# Patient Record
Sex: FEMALE | Race: WHITE | NOT HISPANIC OR LATINO | Employment: UNEMPLOYED | URBAN - METROPOLITAN AREA
[De-identification: names, ages, dates, MRNs, and addresses within clinical notes are randomized per-mention and may not be internally consistent; named-entity substitution may affect disease eponyms.]

---
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
## 2024-02-02 ENCOUNTER — HOSPITAL ENCOUNTER (EMERGENCY)
Facility: HOSPITAL | Age: 24
Discharge: HOME | End: 2024-02-03
Attending: EMERGENCY MEDICINE | Admitting: EMERGENCY MEDICINE
Payer: COMMERCIAL

## 2024-02-02 ENCOUNTER — APPOINTMENT (OUTPATIENT)
Dept: ULTRASOUND IMAGING | Facility: HOSPITAL | Age: 24
End: 2024-02-02
Attending: EMERGENCY MEDICINE
Payer: COMMERCIAL

## 2024-02-02 DIAGNOSIS — R10.84 GENERALIZED ABDOMINAL PAIN: ICD-10-CM

## 2024-02-02 LAB
ABO/RH(D): NORMAL
ALBUMIN UR-MCNC: NEGATIVE MG/DL
AMORPH CRY #/AREA URNS HPF: ABNORMAL /HPF
ANION GAP SERPL CALCULATED.3IONS-SCNC: 7 MMOL/L (ref 7–15)
ANTIBODY SCREEN: NEGATIVE
APPEARANCE UR: ABNORMAL
BASOPHILS # BLD AUTO: 0 10E3/UL (ref 0–0.2)
BASOPHILS NFR BLD AUTO: 0 %
BILIRUB UR QL STRIP: NEGATIVE
BUN SERPL-MCNC: 13.3 MG/DL (ref 6–20)
CALCIUM SERPL-MCNC: 9.5 MG/DL (ref 8.6–10)
CHLORIDE SERPL-SCNC: 106 MMOL/L (ref 98–107)
COLOR UR AUTO: ABNORMAL
CREAT SERPL-MCNC: 0.8 MG/DL (ref 0.51–0.95)
DEPRECATED HCO3 PLAS-SCNC: 28 MMOL/L (ref 22–29)
EGFRCR SERPLBLD CKD-EPI 2021: >90 ML/MIN/1.73M2
EOSINOPHIL # BLD AUTO: 0.1 10E3/UL (ref 0–0.7)
EOSINOPHIL NFR BLD AUTO: 1 %
ERYTHROCYTE [DISTWIDTH] IN BLOOD BY AUTOMATED COUNT: 12.6 % (ref 10–15)
GLUCOSE SERPL-MCNC: 102 MG/DL (ref 70–99)
GLUCOSE UR STRIP-MCNC: NEGATIVE MG/DL
HCG INTACT+B SERPL-ACNC: <1 MIU/ML
HCT VFR BLD AUTO: 42.9 % (ref 35–47)
HGB BLD-MCNC: 14.6 G/DL (ref 11.7–15.7)
HGB UR QL STRIP: NEGATIVE
IMM GRANULOCYTES # BLD: 0 10E3/UL
IMM GRANULOCYTES NFR BLD: 0 %
KETONES UR STRIP-MCNC: NEGATIVE MG/DL
LEUKOCYTE ESTERASE UR QL STRIP: ABNORMAL
LYMPHOCYTES # BLD AUTO: 2.5 10E3/UL (ref 0.8–5.3)
LYMPHOCYTES NFR BLD AUTO: 32 %
MCH RBC QN AUTO: 29.2 PG (ref 26.5–33)
MCHC RBC AUTO-ENTMCNC: 34 G/DL (ref 31.5–36.5)
MCV RBC AUTO: 86 FL (ref 78–100)
MONOCYTES # BLD AUTO: 0.8 10E3/UL (ref 0–1.3)
MONOCYTES NFR BLD AUTO: 10 %
NEUTROPHILS # BLD AUTO: 4.4 10E3/UL (ref 1.6–8.3)
NEUTROPHILS NFR BLD AUTO: 57 %
NITRATE UR QL: NEGATIVE
NRBC # BLD AUTO: 0 10E3/UL
NRBC BLD AUTO-RTO: 0 /100
PH UR STRIP: 7.5 [PH] (ref 5–7)
PLATELET # BLD AUTO: 245 10E3/UL (ref 150–450)
POTASSIUM SERPL-SCNC: 3.8 MMOL/L (ref 3.4–5.3)
RBC # BLD AUTO: 5 10E6/UL (ref 3.8–5.2)
RBC URINE: 0 /HPF
SODIUM SERPL-SCNC: 141 MMOL/L (ref 135–145)
SP GR UR STRIP: 1.02 (ref 1–1.03)
SPECIMEN EXPIRATION DATE: NORMAL
SQUAMOUS EPITHELIAL: 5 /HPF
UROBILINOGEN UR STRIP-MCNC: <2 MG/DL
WBC # BLD AUTO: 7.8 10E3/UL (ref 4–11)
WBC URINE: 0 /HPF

## 2024-02-02 PROCEDURE — 99285 EMERGENCY DEPT VISIT HI MDM: CPT | Mod: 25

## 2024-02-02 PROCEDURE — 250N000013 HC RX MED GY IP 250 OP 250 PS 637: Performed by: EMERGENCY MEDICINE

## 2024-02-02 PROCEDURE — 36415 COLL VENOUS BLD VENIPUNCTURE: CPT | Performed by: EMERGENCY MEDICINE

## 2024-02-02 PROCEDURE — 81001 URINALYSIS AUTO W/SCOPE: CPT | Performed by: EMERGENCY MEDICINE

## 2024-02-02 PROCEDURE — 84702 CHORIONIC GONADOTROPIN TEST: CPT | Performed by: EMERGENCY MEDICINE

## 2024-02-02 PROCEDURE — 80048 BASIC METABOLIC PNL TOTAL CA: CPT | Performed by: EMERGENCY MEDICINE

## 2024-02-02 PROCEDURE — 85025 COMPLETE CBC W/AUTO DIFF WBC: CPT | Performed by: EMERGENCY MEDICINE

## 2024-02-02 PROCEDURE — 76801 OB US < 14 WKS SINGLE FETUS: CPT

## 2024-02-02 PROCEDURE — 86900 BLOOD TYPING SEROLOGIC ABO: CPT | Performed by: EMERGENCY MEDICINE

## 2024-02-02 RX ORDER — ACETAMINOPHEN 325 MG/1
975 TABLET ORAL ONCE
Status: COMPLETED | OUTPATIENT
Start: 2024-02-02 | End: 2024-02-02

## 2024-02-02 RX ADMIN — ACETAMINOPHEN 975 MG: 325 TABLET ORAL at 23:18

## 2024-02-02 ASSESSMENT — ACTIVITIES OF DAILY LIVING (ADL): ADLS_ACUITY_SCORE: 33

## 2024-02-03 VITALS
HEART RATE: 67 BPM | DIASTOLIC BLOOD PRESSURE: 72 MMHG | TEMPERATURE: 97.9 F | RESPIRATION RATE: 16 BRPM | WEIGHT: 144 LBS | OXYGEN SATURATION: 100 % | BODY MASS INDEX: 22.6 KG/M2 | SYSTOLIC BLOOD PRESSURE: 122 MMHG | HEIGHT: 67 IN

## 2024-02-03 ASSESSMENT — ACTIVITIES OF DAILY LIVING (ADL): ADLS_ACUITY_SCORE: 35

## 2024-02-03 NOTE — DISCHARGE INSTRUCTIONS
Your labs show you are not pregnant.  Follow up with a primary clinic. Return for uncontrolled vomiting, fevers >100 or other new concerns.

## 2024-02-03 NOTE — ED TRIAGE NOTES
Patient reports that she is approximately 6 weeks pregnant. She developed abdominal pain yesterday that went away and came back today. She reports pain at her umbilicus and then in her lower abdomen that shoots into her vagina. She denies any vaginal bleeding.      Triage Assessment (Adult)       Row Name 02/02/24 0592          Triage Assessment    Airway WDL WDL        Respiratory WDL    Respiratory WDL WDL        Cardiac WDL    Cardiac WDL WDL        Peripheral/Neurovascular WDL    Peripheral Neurovascular WDL WDL        Cognitive/Neuro/Behavioral WDL    Cognitive/Neuro/Behavioral WDL WDL

## 2024-02-03 NOTE — ED PROVIDER NOTES
"  Emergency Department Encounter     Evaluation Date & Time:   2024 10:17 PM    CHIEF COMPLAINT:  Pregnancy Complications and Abdominal Pain      Triage Note:Patient reports that she is approximately 6 weeks pregnant. She developed abdominal pain yesterday that went away and came back today. She reports pain at her umbilicus and then in her lower abdomen that shoots into her vagina. She denies any vaginal bleeding.           ED COURSE & MEDICAL DECISION MAKING:     Pt here with  for evaluation of generalized abdominal discomfort with occasional \"shooting pain\" into vagina since yesterday or so.  Pt reports she is approx 6 weeks pregnant with no previous pregnancy before ().  Denies any sort of vaginal bleeding, but is having some urinary frequency.  No dysuria, fevers and abdomen overall benign. She has not had US before to confirm IUP. Will get labs, US, treat symptomatically and reassess.      ED Course as of 24 0021      2234 Met with patient and her  for initial interview and exam.   2324 UA neg for obvious infection.   Sat 2024   0008 Hcg quant < 1, pt not pregnant.  Pt already had gone for US.  Will update her.   0011 US of course shows no pregnancy as hcg is negative.  Ovaries unremarkable as well.  Nothing else acute on imaging. Will update, reassess pt.   0012 Rechecked and updated patient. Abdomen entirely benign on recheck.  Discussed with pt results that show she is in fact not pregnant. She reports LMP was , had a + home pregnancy test recently.  Discussed with her reassuring results, outpatient follow up.  She didn't seem concerned about not being pregnant. Advised on follow up, return precautions.           Medical Decision Making  Obtained supplemental history:Supplemental history obtained?: Family Member/Significant Other  Reviewed external records: External records reviewed?: Inpatient Record: [facility] Emergency Room " "01/16/2024  Care impacted by chronic illness:Mental Health and Other: asthma, chronic rhinitis  Care significantly affected by social determinants of health:N/A  Did you consider but not order tests?: Work up considered but not performed and documented in chart, if applicable  Did you interpret images independently?: Independent interpretation of ECG and images noted in documentation, when applicable.  Consultation discussion with other provider:Did you involve another provider (consultant, , pharmacy, etc.)?: No  Discharge. No recommendations on prescription strength medication(s). I considered admission, but ultimately discharged patient after reassuring workup, improvement, outpatient plan.      At the conclusion of the encounter I discussed the results of all the tests and the disposition. The questions were answered. The patient or family acknowledged understanding and was agreeable with the care plan.      MEDICATIONS GIVEN IN THE EMERGENCY DEPARTMENT:  Medications   acetaminophen (TYLENOL) tablet 975 mg (975 mg Oral $Given 2/2/24 3747)       NEW PRESCRIPTIONS STARTED AT TODAY'S ED VISIT:  New Prescriptions    No medications on file       HPI   The history is provided by the patient and the spouse. No  was used.        Annamarie Mejía is a 23 year old female with a pertinent history of chronic rhinitis, asthma, and closed stable burst fracture of second lumbar who presents to this ED by walk in for evaluation of pregnancy complications and abdominal pain.    The patient is about 6 weeks pregnant with her first pregnancy. She reports diffuse abdominal pain today. She states that since yesterday, she has had \"lightning bolt\" pains intermittently that shoot to her vagina. She reports onset of \"heartbeat\" sensation with the pain today. The patient endorses frequent urination and low back pain, but states that she has a history of back surgery.      She denies history of abdominal surgery. She " denies dysuria, hematuria, and vaginal bleeding or persistent vaginal pain or discharge.  Denies concerns for STI. The patient has not had an ultrasound yet.    Per chart review, the patient was seen at [facility] Emergency Room on 01/16/2024 (~2 weeks ago) for evaluation of right ankle injury. Negative x-ray. Negative pregnancy test. CT without obvious fracture. The patient was discharged in stable condition with gel ankle splint and crutches.    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    RSV (respiratory syncytial virus infection)        Past Surgical History:   Procedure Laterality Date    ENT SURGERY  2008    sinus    HC NASAL/SINUS ENDOSCOPY DIAG, W MAX SINUSOSCOPY  2-26-09       Family History   Problem Relation Age of Onset    Alcohol/Drug Mother         meth use during pregnancy    Unknown/Adopted Mother     Eye Disorder Mother     Psychotic Disorder Mother     Unknown/Adopted Father     Unknown/Adopted Maternal Grandmother     Unknown/Adopted Maternal Grandfather     Unknown/Adopted Paternal Grandmother     Unknown/Adopted Paternal Grandfather        Social History     Tobacco Use    Smoking status: Never    Smokeless tobacco: Never    Tobacco comments:     no second hand smoke   Substance Use Topics    Alcohol use: No    Drug use: No       ARIPiprazole (ABILIFY) 20 MG tablet  buPROPion (WELLBUTRIN) 75 MG tablet  gabapentin (NEURONTIN) 100 MG capsule  guanFACINE HCl (INTUNIV) 3 MG TB24 24 hr tablet  hydrOXYzine (VISTARIL) 25 MG capsule  hydrOXYzine (VISTARIL) 50 MG capsule  ibuprofen (ADVIL/MOTRIN) 400 MG tablet  montelukast (SINGULAIR) 10 MG tablet  OXcarbazepine (TRILEPTAL) 300 MG tablet  senna-docusate (SENOKOT-S;PERICOLACE) 8.6-50 MG per tablet        Physical Exam  "    Vitals:  BP (!) 123/93 (BP Location: Left arm)   Pulse 73   Temp 97.9  F (36.6  C) (Temporal)   Resp 16   Ht 1.702 m (5' 7\")   Wt 65.3 kg (144 lb)   LMP 01/03/2024   SpO2 100%   BMI 22.55 kg/m      PHYSICAL EXAM:   Physical Exam  Vitals and nursing note reviewed.   Constitutional:       General: She is not in acute distress.     Appearance: Normal appearance.   HENT:      Head: Normocephalic and atraumatic.      Nose: Nose normal.      Mouth/Throat:      Mouth: Mucous membranes are moist.   Eyes:      Pupils: Pupils are equal, round, and reactive to light.   Cardiovascular:      Rate and Rhythm: Normal rate and regular rhythm.      Pulses: Normal pulses.           Radial pulses are 2+ on the right side and 2+ on the left side.        Dorsalis pedis pulses are 2+ on the right side and 2+ on the left side.   Pulmonary:      Effort: Pulmonary effort is normal. No respiratory distress.      Breath sounds: Normal breath sounds.   Abdominal:      Palpations: Abdomen is soft.      Tenderness: There is no abdominal tenderness. There is no right CVA tenderness or left CVA tenderness. Negative signs include McBurney's sign.   Musculoskeletal:      Cervical back: Full passive range of motion without pain and neck supple.      Comments: No calf tenderness or swelling b/l   Skin:     General: Skin is warm.      Findings: No rash.   Neurological:      General: No focal deficit present.      Mental Status: She is alert. Mental status is at baseline.      Comments: Fluent speech, no acute lateralizing deficits   Psychiatric:         Mood and Affect: Mood normal.         Behavior: Behavior normal.         Results     LAB:  All pertinent labs reviewed and interpreted  Labs Ordered and Resulted from Time of ED Arrival to Time of ED Departure   BASIC METABOLIC PANEL - Abnormal       Result Value    Sodium 141      Potassium 3.8      Chloride 106      Carbon Dioxide (CO2) 28      Anion Gap 7      Urea Nitrogen 13.3      " Creatinine 0.80      GFR Estimate >90      Calcium 9.5      Glucose 102 (*)    ROUTINE UA WITH MICROSCOPIC REFLEX TO CULTURE - Abnormal    Color Urine Light Yellow      Appearance Urine Turbid (*)     Glucose Urine Negative      Bilirubin Urine Negative      Ketones Urine Negative      Specific Gravity Urine 1.017      Blood Urine Negative      pH Urine 7.5 (*)     Protein Albumin Urine Negative      Urobilinogen Urine <2.0      Nitrite Urine Negative      Leukocyte Esterase Urine 25 Marcus/uL (*)     Amorphous Crystals Urine Few (*)     RBC Urine 0      WBC Urine 0      Squamous Epithelials Urine 5 (*)    HCG QUANTITATIVE PREGNANCY - Normal    hCG Quantitative <1     CBC WITH PLATELETS AND DIFFERENTIAL    WBC Count 7.8      RBC Count 5.00      Hemoglobin 14.6      Hematocrit 42.9      MCV 86      MCH 29.2      MCHC 34.0      RDW 12.6      Platelet Count 245      % Neutrophils 57      % Lymphocytes 32      % Monocytes 10      % Eosinophils 1      % Basophils 0      % Immature Granulocytes 0      NRBCs per 100 WBC 0      Absolute Neutrophils 4.4      Absolute Lymphocytes 2.5      Absolute Monocytes 0.8      Absolute Eosinophils 0.1      Absolute Basophils 0.0      Absolute Immature Granulocytes 0.0      Absolute NRBCs 0.0     TYPE AND SCREEN, ADULT    ABO/RH(D) A NEG      Antibody Screen Negative      SPECIMEN EXPIRATION DATE [id number]     ABO/RH TYPE AND SCREEN       RADIOLOGY:  US OB <14 Weeks W Transvaginal   Final Result   IMPRESSION:       1.  No intrauterine gestational sac identified, comminuted due to early gestation but possibility of nonviable pregnancy or ectopic pregnancy is not excluded. Recommend correlation with serial beta hCG levels and short-term follow-up pelvic ultrasound,    as indicated.      2. Small volume free fluid in the posterior cul-de-sac, nonspecific.                   ECG:  none    PROCEDURES:  Procedures:  none      FINAL IMPRESSION:    ICD-10-CM    1. Generalized abdominal pain   R10.84           0 minutes of critical care time      I, Juarez Hebert, am serving as a scribe to document services personally performed by Dr. Demarco Turner, based on my observations and the provider's statements to me. I, Demarco Turner, DO attest that Juarez Hebert is acting in a scribe capacity, has observed my performance of the services and has documented them in accordance with my direction.      Demarco Turner DO  Emergency Medicine  [facility] EMERGENCY DEPARTMENT  2/2/2024  10:40 PM          Demarco Turner MD  02/03/24 0021

## 2024-03-08 ENCOUNTER — HOSPITAL ENCOUNTER (EMERGENCY)
Facility: HOSPITAL | Age: 24
Discharge: HOME | End: 2024-03-08
Attending: EMERGENCY MEDICINE | Admitting: EMERGENCY MEDICINE
Payer: COMMERCIAL

## 2024-03-08 ENCOUNTER — APPOINTMENT (OUTPATIENT)
Dept: ULTRASOUND IMAGING | Facility: HOSPITAL | Age: 24
End: 2024-03-08
Attending: EMERGENCY MEDICINE
Payer: COMMERCIAL

## 2024-03-08 VITALS
HEART RATE: 70 BPM | DIASTOLIC BLOOD PRESSURE: 66 MMHG | OXYGEN SATURATION: 99 % | RESPIRATION RATE: 16 BRPM | SYSTOLIC BLOOD PRESSURE: 110 MMHG | TEMPERATURE: 98.6 F | HEIGHT: 68 IN | WEIGHT: 143 LBS | BODY MASS INDEX: 21.67 KG/M2

## 2024-03-08 DIAGNOSIS — M79.604 BILATERAL LEG PAIN: ICD-10-CM

## 2024-03-08 DIAGNOSIS — M79.605 BILATERAL LEG PAIN: ICD-10-CM

## 2024-03-08 DIAGNOSIS — Z32.01 PREGNANCY TEST POSITIVE: ICD-10-CM

## 2024-03-08 LAB
ALBUMIN SERPL BCG-MCNC: 4.3 G/DL (ref 3.5–5.2)
ALBUMIN UR-MCNC: 10 MG/DL
ALP SERPL-CCNC: 60 U/L (ref 40–150)
ALT SERPL W P-5'-P-CCNC: 14 U/L (ref 0–50)
ANION GAP SERPL CALCULATED.3IONS-SCNC: 10 MMOL/L (ref 7–15)
APPEARANCE UR: CLEAR
AST SERPL W P-5'-P-CCNC: 21 U/L (ref 0–45)
BACTERIA #/AREA URNS HPF: ABNORMAL /HPF
BASOPHILS # BLD AUTO: 0 10E3/UL (ref 0–0.2)
BASOPHILS NFR BLD AUTO: 0 %
BILIRUB SERPL-MCNC: 0.3 MG/DL
BILIRUB UR QL STRIP: NEGATIVE
BUN SERPL-MCNC: 15.4 MG/DL (ref 6–20)
CALCIUM SERPL-MCNC: 9.4 MG/DL (ref 8.6–10)
CHLORIDE SERPL-SCNC: 106 MMOL/L (ref 98–107)
CK SERPL-CCNC: 47 U/L (ref 26–192)
COLOR UR AUTO: ABNORMAL
CREAT SERPL-MCNC: 0.81 MG/DL (ref 0.51–0.95)
CRP SERPL-MCNC: <3 MG/L
DEPRECATED HCO3 PLAS-SCNC: 23 MMOL/L (ref 22–29)
EGFRCR SERPLBLD CKD-EPI 2021: >90 ML/MIN/1.73M2
EOSINOPHIL # BLD AUTO: 0.1 10E3/UL (ref 0–0.7)
EOSINOPHIL NFR BLD AUTO: 1 %
ERYTHROCYTE [DISTWIDTH] IN BLOOD BY AUTOMATED COUNT: 12.2 % (ref 10–15)
ERYTHROCYTE [SEDIMENTATION RATE] IN BLOOD BY WESTERGREN METHOD: 6 MM/HR (ref 0–20)
GLUCOSE SERPL-MCNC: 96 MG/DL (ref 70–99)
GLUCOSE UR STRIP-MCNC: NEGATIVE MG/DL
HCG UR QL: POSITIVE
HCT VFR BLD AUTO: 41.1 % (ref 35–47)
HGB BLD-MCNC: 13.5 G/DL (ref 11.7–15.7)
HGB UR QL STRIP: NEGATIVE
IMM GRANULOCYTES # BLD: 0 10E3/UL
IMM GRANULOCYTES NFR BLD: 0 %
KETONES UR STRIP-MCNC: NEGATIVE MG/DL
LEUKOCYTE ESTERASE UR QL STRIP: ABNORMAL
LYMPHOCYTES # BLD AUTO: 2.1 10E3/UL (ref 0.8–5.3)
LYMPHOCYTES NFR BLD AUTO: 33 %
MAGNESIUM SERPL-MCNC: 1.9 MG/DL (ref 1.7–2.3)
MCH RBC QN AUTO: 28.4 PG (ref 26.5–33)
MCHC RBC AUTO-ENTMCNC: 32.8 G/DL (ref 31.5–36.5)
MCV RBC AUTO: 87 FL (ref 78–100)
MONOCYTES # BLD AUTO: 0.4 10E3/UL (ref 0–1.3)
MONOCYTES NFR BLD AUTO: 7 %
MUCOUS THREADS #/AREA URNS LPF: PRESENT /LPF
NEUTROPHILS # BLD AUTO: 3.6 10E3/UL (ref 1.6–8.3)
NEUTROPHILS NFR BLD AUTO: 59 %
NITRATE UR QL: NEGATIVE
NRBC # BLD AUTO: 0 10E3/UL
NRBC BLD AUTO-RTO: 0 /100
PH UR STRIP: 5.5 [PH] (ref 5–7)
PLATELET # BLD AUTO: 243 10E3/UL (ref 150–450)
POTASSIUM SERPL-SCNC: 4.2 MMOL/L (ref 3.4–5.3)
PROT SERPL-MCNC: 6.8 G/DL (ref 6.4–8.3)
RBC # BLD AUTO: 4.75 10E6/UL (ref 3.8–5.2)
RBC URINE: <1 /HPF
SODIUM SERPL-SCNC: 139 MMOL/L (ref 135–145)
SP GR UR STRIP: 1.03 (ref 1–1.03)
SQUAMOUS EPITHELIAL: 2 /HPF
TRANSITIONAL EPI: <1 /HPF
UROBILINOGEN UR STRIP-MCNC: <2 MG/DL
WBC # BLD AUTO: 6.2 10E3/UL (ref 4–11)
WBC URINE: 3 /HPF

## 2024-03-08 PROCEDURE — 250N000013 HC RX MED GY IP 250 OP 250 PS 637: Performed by: EMERGENCY MEDICINE

## 2024-03-08 PROCEDURE — 85652 RBC SED RATE AUTOMATED: CPT | Performed by: EMERGENCY MEDICINE

## 2024-03-08 PROCEDURE — 85004 AUTOMATED DIFF WBC COUNT: CPT | Performed by: EMERGENCY MEDICINE

## 2024-03-08 PROCEDURE — 36415 COLL VENOUS BLD VENIPUNCTURE: CPT | Performed by: EMERGENCY MEDICINE

## 2024-03-08 PROCEDURE — 81001 URINALYSIS AUTO W/SCOPE: CPT | Performed by: EMERGENCY MEDICINE

## 2024-03-08 PROCEDURE — 93970 EXTREMITY STUDY: CPT

## 2024-03-08 PROCEDURE — 81025 URINE PREGNANCY TEST: CPT | Performed by: EMERGENCY MEDICINE

## 2024-03-08 PROCEDURE — 86140 C-REACTIVE PROTEIN: CPT | Performed by: EMERGENCY MEDICINE

## 2024-03-08 PROCEDURE — 82550 ASSAY OF CK (CPK): CPT | Performed by: EMERGENCY MEDICINE

## 2024-03-08 PROCEDURE — 80053 COMPREHEN METABOLIC PANEL: CPT | Performed by: EMERGENCY MEDICINE

## 2024-03-08 PROCEDURE — 83735 ASSAY OF MAGNESIUM: CPT | Performed by: EMERGENCY MEDICINE

## 2024-03-08 PROCEDURE — 99284 EMERGENCY DEPT VISIT MOD MDM: CPT | Mod: 25

## 2024-03-08 RX ORDER — IBUPROFEN 600 MG/1
600 TABLET, FILM COATED ORAL ONCE
Status: DISCONTINUED | OUTPATIENT
Start: 2024-03-08 | End: 2024-03-08

## 2024-03-08 RX ORDER — ACETAMINOPHEN 325 MG/1
975 TABLET ORAL ONCE
Status: COMPLETED | OUTPATIENT
Start: 2024-03-08 | End: 2024-03-08

## 2024-03-08 RX ORDER — GRANULES FOR ORAL 3 G/1
3 POWDER ORAL ONCE
Status: COMPLETED | OUTPATIENT
Start: 2024-03-08 | End: 2024-03-08

## 2024-03-08 RX ADMIN — ACETAMINOPHEN 975 MG: 325 TABLET ORAL at 18:16

## 2024-03-08 RX ADMIN — GRANULES FOR ORAL SOLUTION 3 G: 3 POWDER ORAL at 21:29

## 2024-03-08 ASSESSMENT — ENCOUNTER SYMPTOMS
VOMITING: 0
FEVER: 0
COUGH: 0
NUMBNESS: 1
NAUSEA: 1

## 2024-03-08 ASSESSMENT — ACTIVITIES OF DAILY LIVING (ADL)
ADLS_ACUITY_SCORE: 35
ADLS_ACUITY_SCORE: 33
ADLS_ACUITY_SCORE: 35
ADLS_ACUITY_SCORE: 35

## 2024-03-08 ASSESSMENT — COLUMBIA-SUICIDE SEVERITY RATING SCALE - C-SSRS
6. HAVE YOU EVER DONE ANYTHING, STARTED TO DO ANYTHING, OR PREPARED TO DO ANYTHING TO END YOUR LIFE?: NO
1. IN THE PAST MONTH, HAVE YOU WISHED YOU WERE DEAD OR WISHED YOU COULD GO TO SLEEP AND NOT WAKE UP?: NO
2. HAVE YOU ACTUALLY HAD ANY THOUGHTS OF KILLING YOURSELF IN THE PAST MONTH?: NO

## 2024-03-08 NOTE — ED TRIAGE NOTES
Patient comes to ED for evaluation of bilateral leg pain. Stated left leg is worse than right. Started 2 days ago after lifting heavy objects when moving. Denies any injury or trauma. Has not taken anything for pain.     Triage Assessment (Adult)       Row Name 03/08/24 6281          Triage Assessment    Airway WDL WDL        Respiratory WDL    Respiratory WDL WDL        Skin Circulation/Temperature WDL    Skin Circulation/Temperature WDL WDL        Cardiac WDL    Cardiac WDL WDL        Peripheral/Neurovascular WDL    Peripheral Neurovascular WDL WDL        Cognitive/Neuro/Behavioral WDL    Cognitive/Neuro/Behavioral WDL WDL

## 2024-03-08 NOTE — ED PROVIDER NOTES
EMERGENCY DEPARTMENT ENCOUNTER      NAME: Annamarie Mejía  AGE: 23 year old female  YOB: 2000  MRN: [id number]  EVALUATION DATE & TIME: 3/8/2024  5:52 PM    PCP: No Ref-Primary, Physician    ED PROVIDER: Belkys Shelley PA-C      Chief Complaint   Patient presents with    Leg Pain         FINAL IMPRESSION:  1. Bilateral leg pain          ED COURSE & MEDICAL DECISION MAKING:    Pertinent Labs & Imaging studies reviewed. (See chart for details)    23 year old female presents to the Emergency Department for evaluation of leg pain.     Physical exam is remarkable for a generally well appearing female who is in no acute distress. Heart and lung sounds are clear diffusely throughout. Abdomen is soft and non-tender. She has diffuse tenderness to palpation on the bilateral legs but compartments are soft and non-tender. She has good distal sensation in the toes bilaterally, capillary refill is less than 2 seconds, and strong dorsal pedal pulses identified bilaterally. Intact 2+ bilateral patellar reflexes. Strength decreased to 4/5 bilaterally in the legs but intact with distraction. Vital signs are stable and she is afebrile.    CBC is unremarkable with no leukocytosis or anemia.  CMP is unremarkable with no significant electrolyte derangements, normal liver and kidney function. CRP and sed rate both within normal limits. CK within normal limits. Magnesium within normal limits. US of the bilateral legs negative with no evidence of DVT. Urinalysis with few bacteria although may be contamination, pregnancy test was positive.     The patient was given tylenol here for treatment of pain. She was able to ambulate at the end of her visit. I do not think any further emergent labs or imaging are indicated at this time. She is overall well appearing here and her workup is reassuring. She has a normal neurologic exam with no abnormal reflexes, sensation, or function. She denies any red flag back symptoms that I think  warrant spinal MRI including bowel or bladder incontinence, saddle anesthesia. No clinical findings concerning for infection or compartment syndrome. Advised her to take tylenol for pain, recommend follow up with PCP to establish care for her pregnancy and for recheck. Given bacteria in urine here today and pregnancy test positive, we will give her a single dose of fosfomycin to treat asymptomatic bacteruria. Advised return here for any new or worsening symptoms, patient is agreeable with this treatment plan and verbalized understanding. Care was discussed with staff physician Dr. Azucena Garcia who is in agreement with the treatment plan.      Medical Decision Making    History:  Supplemental history from: Documented in chart  External Record(s) reviewed: Documented in chart    Work Up:  Chart documentation includes differential considered and any EKGs or imaging independently interpreted by provider, where specified.  In additional to work up documented, I considered the following work up: Documented in chart, if applicable.    External consultation:  Discussion of management with another provider: Documented in chart, if applicable    Complicating factors:  Care impacted by chronic illness: Chronic Lung Disease, Mental Health, and Smoking / Nicotine Use  Care affected by social determinants of health: Medication Noncompliance    Disposition considerations: Discharge. No recommendations on prescription strength medication(s). I considered admission, but discharged patient after significant clinical improvement.    ED Course   5:30 PM Performed my initial history and physical exam. Discussed workup in the emergency department, management of symptoms, and likely disposition.   6:13 PM Staffed the patient with Dr. Garcia  6:56 PM Spoke with Dr. Garcia regarding her assessment of the patient.   8:50 PM Checked on the patient and updated her on the current treatment plan.  9:10 PM I discussed the plan for discharge  "with the patient or family and they are agreeable.. We discussed supportive cares at home and reasons for return to the ER including new or worsening symptoms - all questions and concerns addressed. Patient to be discharged by RN.    At the conclusion of the encounter I discussed the results of all of the tests and the disposition. The questions were answered. The patient or family acknowledged understanding and was agreeable with the care plan.     Voice recognition software was used in the creation of this note. Any grammatical or nonsensical errors are due to inherent errors with the software and are not the intention of the writer.     MEDICATIONS GIVEN IN THE EMERGENCY:  Medications   acetaminophen (TYLENOL) tablet 975 mg (975 mg Oral $Given 3/8/24 1816)   fosfomycin (MONUROL) Packet 3 g (3 g Oral $Given 3/8/24 2129)       NEW PRESCRIPTIONS STARTED AT TODAY'S ER VISIT  Discharge Medication List as of 3/8/2024  9:07 PM               =================================================================    HPI    Patient information was obtained from: the patient    Use of : N/A       Annamarie Mejía is a 23 year old female with a medical history of patellofemoral pain syndrome, psychogenic nonepileptic seizure, PTSD, anxiety, and developmental delay, who presents for evaluation of leg pain.     The patient was \"moving stuff\" two days ago, and has had bilateral leg pain since then. She states that the pain begins in the posterior mid thigh and extends into both of her ankles. The pain is described as a tightness with occasional \"shocks.\" She reports that she has been unable to ambulate at all since onset of pain. She also endorses mild numbness of bilateral feet on occasion. No history of pain like this. She has not taken any medication at home for pain relief. The patient notes that she is trying to get pregnant and has had some nausea, but no vomiting, fever, or cough. LMP February 8th, lasted one day. No " history of DVT/PE. The patient notes that she recently drove to [place], but this trip happened after pain had started. No recent vaccinations. The patient denies saddle anesthesia, bowel or bladder incontinence, or any other symptoms at this time.     REVIEW OF SYSTEMS   Review of Systems   Constitutional:  Negative for fever.   Respiratory:  Negative for cough.    Gastrointestinal:  Positive for nausea. Negative for vomiting.   Musculoskeletal:         Positive for bilateral leg pain   Neurological:  Positive for numbness (occasionally in bilateral feet).        Negative for saddle anesthesia and bowel/bladder incontinence   All other systems reviewed and are negative.      All other systems reviewed and are negative unless noted in HPI.      PAST MEDICAL HISTORY:  Past Medical History:   Diagnosis Date    Allergies 12/08    rast all negative    Asthma     Behavior problems     Chronic sinusitis     Developmental delay     Intermittent asthma 8/27/2012    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    psych hospitalization 6.2.2010, rehospitalized 6.18.2010    RSV (respiratory syncytial virus infection)     hospital x1       PAST SURGICAL HISTORY:  Past Surgical History:   Procedure Laterality Date    ENT SURGERY  2008    sinus    [facility] NASAL/SINUS ENDOSCOPY DIAG, W MAX SINUSOSCOPY  2-26-09       CURRENT MEDICATIONS:    ARIPiprazole (ABILIFY) 20 MG tablet  buPROPion (WELLBUTRIN) 75 MG tablet  gabapentin (NEURONTIN) 100 MG capsule  guanFACINE HCl (INTUNIV) 3 MG TB24 24 hr tablet  hydrOXYzine (VISTARIL) 25 MG capsule  hydrOXYzine (VISTARIL) 50 MG capsule  ibuprofen (ADVIL/MOTRIN) 400 MG tablet  montelukast (SINGULAIR) 10 MG tablet  OXcarbazepine (TRILEPTAL) 300 MG tablet  senna-docusate (SENOKOT-S;PERICOLACE) 8.6-50 MG per tablet        ALLERGIES:  Allergies   Allergen Reactions    Gentamicin Itching, Swelling, Other (See Comments) and Rash     Facial swelling      Clindamycin Rash    Sulfa Antibiotics Rash    Latex      "Sertraline        FAMILY HISTORY:  Family History   Problem Relation Age of Onset    Alcohol/Drug Mother         meth use during pregnancy    Unknown/Adopted Mother     Eye Disorder Mother     Psychotic Disorder Mother     Unknown/Adopted Father     Unknown/Adopted Maternal Grandmother     Unknown/Adopted Maternal Grandfather     Unknown/Adopted Paternal Grandmother     Unknown/Adopted Paternal Grandfather        SOCIAL HISTORY:   Social History     Socioeconomic History    Marital status: Single   Tobacco Use    Smoking status: Never    Smokeless tobacco: Never    Tobacco comments:     no second hand smoke   Substance and Sexual Activity    Alcohol use: No    Drug use: No    Sexual activity: Never     Partners: Male     Birth control/protection: None   Social History Narrative    Lives with adoptive parents (cousins of bio mom) and infant brother.       VITALS:  Patient Vitals for the past 24 hrs:   BP Temp Temp src Pulse Resp SpO2 Height Weight   03/08/24 2130 110/66 -- -- 70 16 99 % -- --   03/08/24 2030 111/68 -- -- 71 -- 99 % -- --   03/08/24 1652 120/73 98.6  F (37  C) Oral 90 18 97 % 1.727 m (5' 8\") 64.9 kg (143 lb)       PHYSICAL EXAM    VITAL SIGNS: /66   Pulse 70   Temp 98.6  F (37  C) (Oral)   Resp 16   Ht 1.727 m (5' 8\")   Wt 64.9 kg (143 lb)   LMP 02/08/2024 (Approximate)   SpO2 99%   BMI 21.74 kg/m    General Appearance: Alert, cooperative, normal speech and facial symmetry, appears stated age, the patient does not appear in distress  Head:  Normocephalic, without obvious abnormality, atraumatic  Eyes: Conjunctiva/corneas clear, EOM's intact, no nystagmus, PERRL  ENT:  Lips, mucosa, and tongue normal; teeth and gums normal, no pharyngeal inflammation, no dysphonia or difficulty swallowing, membranes are moist without pallor  Cardio:  Regular rate and rhythm, S1 and S2 normal, no murmur, rub    or gallop, 2+ pulses symmetric in all extremities  Pulm:  Clear to auscultation bilaterally, " respirations unlabored with no accessory muscle use  Abdomen:  Active bowel sounds in all quadrants; abdomen is soft, non-distended with no tenderness to palpation, rebound tenderness, or guarding.   Back: No midline tenderness or step-offs, no CVA tenderness  Extremities: Diffuse tenderness to palpation on the bilateral legs but compartments are soft and non-tender. She has good distal sensation in the toes bilaterally, capillary refill is less than 2 seconds, and strong dorsal pedal pulses identified bilaterally. Intact 2+ bilateral patellar reflexes. Strength decreased to 4/5 bilaterally in the legs but intact with distraction  Neuro: Patient is awake, alert, and responsive to voice. No gross motor weaknesses or sensory loss; moves all extremities.    LAB:  All pertinent labs reviewed and interpreted.  Labs Ordered and Resulted from Time of ED Arrival to Time of ED Departure   HCG QUALITATIVE URINE - Abnormal       Result Value    hCG Urine Qualitative Positive (*)    ROUTINE UA WITH MICROSCOPIC REFLEX TO CULTURE - Abnormal    Color Urine Light Yellow      Appearance Urine Clear      Glucose Urine Negative      Bilirubin Urine Negative      Ketones Urine Negative      Specific Gravity Urine 1.027      Blood Urine Negative      pH Urine 5.5      Protein Albumin Urine 10 (*)     Urobilinogen Urine <2.0      Nitrite Urine Negative      Leukocyte Esterase Urine 25 Marcus/uL (*)     Bacteria Urine Few (*)     Mucus Urine Present (*)     RBC Urine <1      WBC Urine 3      Squamous Epithelials Urine 2 (*)     Transitional Epithelials Urine <1     COMPREHENSIVE METABOLIC PANEL - Normal    Sodium 139      Potassium 4.2      Carbon Dioxide (CO2) 23      Anion Gap 10      Urea Nitrogen 15.4      Creatinine 0.81      GFR Estimate >90      Calcium 9.4      Chloride 106      Glucose 96      Alkaline Phosphatase 60      AST 21      ALT 14      Protein Total 6.8      Albumin 4.3      Bilirubin Total 0.3     MAGNESIUM - Normal     Magnesium 1.9     CRP INFLAMMATION - Normal    CRP Inflammation <3.00     ERYTHROCYTE SEDIMENTATION RATE AUTO - Normal    Erythrocyte Sedimentation Rate 6     CK TOTAL - Normal    CK 47     CBC WITH PLATELETS AND DIFFERENTIAL    WBC Count 6.2      RBC Count 4.75      Hemoglobin 13.5      Hematocrit 41.1      MCV 87      MCH 28.4      MCHC 32.8      RDW 12.2      Platelet Count 243      % Neutrophils 59      % Lymphocytes 33      % Monocytes 7      % Eosinophils 1      % Basophils 0      % Immature Granulocytes 0      NRBCs per 100 WBC 0      Absolute Neutrophils 3.6      Absolute Lymphocytes 2.1      Absolute Monocytes 0.4      Absolute Eosinophils 0.1      Absolute Basophils 0.0      Absolute Immature Granulocytes 0.0      Absolute NRBCs 0.0         RADIOLOGY:  Reviewed all pertinent imaging. Please see official radiology report.  US Lower Extremity Venous Duplex Bilateral   Final Result   IMPRESSION:   1.  No deep venous thrombosis in the bilateral lower extremities.          EKG:    N/A      I, Leesa Pena, am serving as a scribe to document services personally performed by Belkys Shelley PA-C based on my observation and the provider's statements to me. I, Belkys Shelley PA-C attest that Leesa Pena is acting in a scribe capacity, has observed my performance of the services and has documented them in accordance with my direction.     Belkys Shelley PA-C  Emergency Medicine  [facility] EMERGENCY DEPARTMENT  [address] [facility] [address] [phone]  Dept: [phone]       Belkys Shelley PA-C  03/08/24 5212

## 2024-03-09 NOTE — ED PROVIDER NOTES
"Emergency Department Midlevel Supervisory Note     I had a face to face encounter with this patient seen by the Advanced Practice Provider (YARELIS). I personally made/approved the management plan and take responsibility for the patient management. I personally saw patient and performed a substantive portion of the visit including all aspects of the medical decision making.     ED Course:  6:34 PM  Belkys Shelley PA-C staffed patient with me. I agree with their assessment and plan of management, and I will see the patient.  6:54 PM  I met with the patient to introduce myself, gather additional history, perform my initial exam, and discuss the plan.     Brief HPI:     Annamarie Mejía is a 23 year old female who presents for evaluation of b leg pain.  She notes that she has been in the process of moving and has been carrying a lot of boxes over the course of the past 2 days.  She has been going up and down a lot of stairs and now over the last days she feels that she has been not able to walk \"at all \"but when asked further, she actually has been getting herself to the bathroom, she has been drinking plenty of fluids to try to flush out her system, and has even traveled to [place] and back.  No fever.  No other trauma.  She has a remote history of a MCL injury to her knee that she has not had repaired as a teenager.        Brief Physical Exam: /66   Pulse 70   Temp 98.6  F (37  C) (Oral)   Resp 16   Ht 1.727 m (5' 8\")   Wt 64.9 kg (143 lb)   LMP 02/08/2024 (Approximate)   SpO2 99%   BMI 21.74 kg/m    Constitutional:  Alert, in no acute distress  EYES: Conjunctivae clear  HENT:  Atraumatic  Respiratory:  Respirations even, unlabored, in no acute respiratory distress  Cardiovascular:  Regular rate and rhythm, good peripheral perfusion  GI: Soft, non-distended, non-tender  Musculoskeletal:  Moves all 4 extremities equally, grossly symmetrical strength  Integument: Warm & dry. No appreciable rash, " erythema.  Neurologic:  Alert & oriented, speech clear and fluent, no focal deficits noted  Psych: Normal mood and affect       MDM:  Because of the travel we did do ultrasound of her legs to assure that there is no DVT.  She is more painful behind her right knee so certainly may have a chondral or Baker's cyst there from the increased activity.  CK makes sense to make sure that there is no severe rhabdomyolysis but she really is not particularly swollen and is not painful at rest.  This seems more muscular pain and does not follow a particular dermatome nor is there numbness associated with it so I do not find any evidence of spinal lesion to cause her symptoms.    She is wonder if she is pregnant so we will do a pregnancy test but her LMP was less than a week ago so it may still be negative even if she is in early pregnancy.  She would like to do acetaminophen for pain and we will avoid ibuprofen for now.    She was able to get up and ambulate to the bathroom, upt is positive, so she will follow up with her provider for pregnancy.  Otherwise, use acetaminophen for pain.  Keep hydrating.  No other lab abnormalities of concern and she was discharged home with musculoskeletal pain and incidental pregnancy.       1. Bilateral leg pain    2. Pregnancy test positive        Labs and Imaging:  Results for orders placed or performed during the hospital encounter of 03/08/24   US Lower Extremity Venous Duplex Bilateral    Impression    IMPRESSION:  1.  No deep venous thrombosis in the bilateral lower extremities.   Comprehensive metabolic panel   Result Value Ref Range    Sodium 139 135 - 145 mmol/L    Potassium 4.2 3.4 - 5.3 mmol/L    Carbon Dioxide (CO2) 23 22 - 29 mmol/L    Anion Gap 10 7 - 15 mmol/L    Urea Nitrogen 15.4 6.0 - 20.0 mg/dL    Creatinine 0.81 0.51 - 0.95 mg/dL    GFR Estimate >90 >60 mL/min/1.73m2    Calcium 9.4 8.6 - 10.0 mg/dL    Chloride 106 98 - 107 mmol/L    Glucose 96 70 - 99 mg/dL    Alkaline  Phosphatase 60 40 - 150 U/L    AST 21 0 - 45 U/L    ALT 14 0 - 50 U/L    Protein Total 6.8 6.4 - 8.3 g/dL    Albumin 4.3 3.5 - 5.2 g/dL    Bilirubin Total 0.3 <=1.2 mg/dL   Result Value Ref Range    Magnesium 1.9 1.7 - 2.3 mg/dL   Result Value Ref Range    CRP Inflammation <3.00 <5.00 mg/L   Erythrocyte sedimentation rate auto   Result Value Ref Range    Erythrocyte Sedimentation Rate 6 0 - 20 mm/hr   Result Value Ref Range    CK 47 26 - 192 U/L   HCG qualitative urine (UPT)   Result Value Ref Range    hCG Urine Qualitative Positive (A) Negative   UA with Microscopic reflex to Culture    Specimen: Urine, Midstream   Result Value Ref Range    Color Urine Light Yellow Colorless, Straw, Light Yellow, Yellow    Appearance Urine Clear Clear    Glucose Urine Negative Negative mg/dL    Bilirubin Urine Negative Negative    Ketones Urine Negative Negative mg/dL    Specific Gravity Urine 1.027 1.001 - 1.030    Blood Urine Negative Negative    pH Urine 5.5 5.0 - 7.0    Protein Albumin Urine 10 (A) Negative mg/dL    Urobilinogen Urine <2.0 <2.0 mg/dL    Nitrite Urine Negative Negative    Leukocyte Esterase Urine 25 Marcus/uL (A) Negative    Bacteria Urine Few (A) None Seen /HPF    Mucus Urine Present (A) None Seen /LPF    RBC Urine <1 <=2 /HPF    WBC Urine 3 <=5 /HPF    Squamous Epithelials Urine 2 (H) <=1 /HPF    Transitional Epithelials Urine <1 <=1 /HPF   CBC with platelets and differential   Result Value Ref Range    WBC Count 6.2 4.0 - 11.0 10e3/uL    RBC Count 4.75 3.80 - 5.20 10e6/uL    Hemoglobin 13.5 11.7 - 15.7 g/dL    Hematocrit 41.1 35.0 - 47.0 %    MCV 87 78 - 100 fL    MCH 28.4 26.5 - 33.0 pg    MCHC 32.8 31.5 - 36.5 g/dL    RDW 12.2 10.0 - 15.0 %    Platelet Count 243 150 - 450 10e3/uL    % Neutrophils 59 %    % Lymphocytes 33 %    % Monocytes 7 %    % Eosinophils 1 %    % Basophils 0 %    % Immature Granulocytes 0 %    NRBCs per 100 WBC 0 <1 /100    Absolute Neutrophils 3.6 1.6 - 8.3 10e3/uL    Absolute Lymphocytes  2.1 0.8 - 5.3 10e3/uL    Absolute Monocytes 0.4 0.0 - 1.3 10e3/uL    Absolute Eosinophils 0.1 0.0 - 0.7 10e3/uL    Absolute Basophils 0.0 0.0 - 0.2 10e3/uL    Absolute Immature Granulocytes 0.0 <=0.4 10e3/uL    Absolute NRBCs 0.0 10e3/uL       I have reviewed the relevant laboratory studies above.    I independently interpreted the following imaging study(s):   ultrasound        Procedures:  I was present for the key portions of procedures documented in YARELIS/midlevel note, see midlevel note for further details.    Azucena Garcia MD  [facility] EMERGENCY DEPARTMENT  [facility] [facility] [address] [phone]      Azucena Garcia MD  03/09/24 0002

## 2024-03-09 NOTE — DISCHARGE INSTRUCTIONS
You were seen here today for evaluation of leg pain. Your workup today is very reassuring with no evidence of infection, electrolyte problems, or damage to your muscles. Your ultrasounds were negative with no evidence of blood clots.  Your pregnancy test was positive so continue to take your prenatals and schedule a clinic appointment.    Take tylenol for pain, do not exceed 4000 mg per day.     Follow up with your primary care provider for recheck. Return here for any new or worsening symptoms including severe pain, inability to walk, peeing or pooping in your pants, numbness or tingling in your genitals, or any other symptoms that concern you.

## 2024-07-12 ENCOUNTER — HOSPITAL ENCOUNTER (OUTPATIENT)
Facility: HOSPITAL | Age: 24
Discharge: HOME | End: 2024-07-12
Attending: FAMILY MEDICINE | Admitting: OBSTETRICS & GYNECOLOGY
Payer: COMMERCIAL

## 2024-07-12 ENCOUNTER — HOSPITAL ENCOUNTER (OUTPATIENT)
Facility: HOSPITAL | Age: 24
End: 2024-07-12
Admitting: OBSTETRICS & GYNECOLOGY
Payer: COMMERCIAL

## 2024-07-12 VITALS
BODY MASS INDEX: 25.88 KG/M2 | HEART RATE: 76 BPM | SYSTOLIC BLOOD PRESSURE: 109 MMHG | TEMPERATURE: 99.1 F | DIASTOLIC BLOOD PRESSURE: 63 MMHG | RESPIRATION RATE: 18 BRPM | WEIGHT: 161 LBS | HEIGHT: 66 IN

## 2024-07-12 PROBLEM — Z36.89 ENCOUNTER FOR TRIAGE IN PREGNANT PATIENT: Status: ACTIVE | Noted: 2024-07-12

## 2024-07-12 LAB
ALBUMIN UR-MCNC: NEGATIVE MG/DL
APPEARANCE UR: CLEAR
BACTERIA #/AREA URNS HPF: ABNORMAL /HPF
BILIRUB UR QL STRIP: NEGATIVE
CLUE CELLS: PRESENT
COLOR UR AUTO: ABNORMAL
GLUCOSE UR STRIP-MCNC: NEGATIVE MG/DL
HGB UR QL STRIP: NEGATIVE
KETONES UR STRIP-MCNC: NEGATIVE MG/DL
LEUKOCYTE ESTERASE UR QL STRIP: ABNORMAL
NITRATE UR QL: NEGATIVE
PH UR STRIP: 7 [PH] (ref 5–7)
RBC URINE: <1 /HPF
SP GR UR STRIP: 1.01 (ref 1–1.03)
SQUAMOUS EPITHELIAL: 2 /HPF
TRICHOMONAS, WET PREP: ABNORMAL
UROBILINOGEN UR STRIP-MCNC: <2 MG/DL
WBC URINE: 2 /HPF
WBC'S/HIGH POWER FIELD, WET PREP: ABNORMAL
YEAST, WET PREP: ABNORMAL

## 2024-07-12 PROCEDURE — 81001 URINALYSIS AUTO W/SCOPE: CPT | Performed by: OBSTETRICS & GYNECOLOGY

## 2024-07-12 PROCEDURE — G0463 HOSPITAL OUTPT CLINIC VISIT: HCPCS | Mod: 25

## 2024-07-12 PROCEDURE — 87210 SMEAR WET MOUNT SALINE/INK: CPT | Performed by: OBSTETRICS & GYNECOLOGY

## 2024-07-12 RX ORDER — LIDOCAINE 40 MG/G
CREAM TOPICAL
Status: DISCONTINUED | OUTPATIENT
Start: 2024-07-12 | End: 2024-07-12 | Stop reason: HOSPADM

## 2024-07-12 ASSESSMENT — ACTIVITIES OF DAILY LIVING (ADL)
ADLS_ACUITY_SCORE: 18
ADLS_ACUITY_SCORE: 18

## 2024-07-12 NOTE — PROGRESS NOTES
Annamarie and her  Fabricio stated that they believed that their insurance wouldn't cover medications prescribed by a different doctor.  And that Annamarie's doctor had to prescribe. RN asked for the doctors name and stated that they would have to be the ones to contact the physician, but that I would check on that.     While RN was in another RN the family handed our [facility] the providers info and left.     [id number]  [facility] [address]    Yazmin Leavitt CNP    [phone] - fax  [phone] - phone      RN called Annamarie - [phone]  Left voice mail. that RN can not call the other physician due to privileges at [facility]. Encouraged Annamarie to check with insurance and the pharmacy and believes there is a chance that it is covered.

## 2024-07-12 NOTE — PROVIDER NOTIFICATION
Data: Patient presented to [facility]: 2024 12:33 PM.  Reason for maternal/fetal assessment is uterine contractions. Patient reports contractions or tightening starting at 0300 every minute lasting for 30 sections on and off. 4/10 pain. Patient denies leaking of vaginal fluid/rupture of membranes, vaginal bleeding. Patient reports fetal movement is normal. Patient is a 22w1d . Prenatal record reviewed. Pregnancy has been uncomplicated. Support person is not present.     Fetal HR baseline was 140's by doppler    Vital signs wnl. Patient reports pain and is coping.     Action: RN reported to Dr. Sales. Annamarie is here for contractions. Doptones were WDL. Abdomen was soft. Possible mild contractions on the monitor. Difficult to tell. Annamarie stated post coital  in the last 24 hours.     Orders received.

## 2024-07-12 NOTE — PROVIDER NOTIFICATION
RN updated MD on results and SVE being closed/thick and high.    Orders for discharge. MD will send a prescription to her pharmacy.     RN will discuss instructions with Annamarie.

## 2024-08-29 ENCOUNTER — HOSPITAL ENCOUNTER (EMERGENCY)
Facility: HOSPITAL | Age: 24
Discharge: HOME | End: 2024-08-30
Attending: EMERGENCY MEDICINE | Admitting: EMERGENCY MEDICINE
Payer: COMMERCIAL

## 2024-08-29 DIAGNOSIS — T63.441A BEE STING, ACCIDENTAL OR UNINTENTIONAL, INITIAL ENCOUNTER: ICD-10-CM

## 2024-08-29 DIAGNOSIS — T78.40XA ALLERGIC REACTION, INITIAL ENCOUNTER: ICD-10-CM

## 2024-08-29 PROCEDURE — 99285 EMERGENCY DEPT VISIT HI MDM: CPT | Mod: 25

## 2024-08-29 PROCEDURE — 250N000011 HC RX IP 250 OP 636: Performed by: EMERGENCY MEDICINE

## 2024-08-29 PROCEDURE — 96361 HYDRATE IV INFUSION ADD-ON: CPT

## 2024-08-29 PROCEDURE — 96374 THER/PROPH/DIAG INJ IV PUSH: CPT

## 2024-08-29 PROCEDURE — 96375 TX/PRO/DX INJ NEW DRUG ADDON: CPT

## 2024-08-29 PROCEDURE — 258N000003 HC RX IP 258 OP 636: Performed by: EMERGENCY MEDICINE

## 2024-08-29 PROCEDURE — 250N000009 HC RX 250: Performed by: EMERGENCY MEDICINE

## 2024-08-29 RX ORDER — LIDOCAINE 40 MG/G
CREAM TOPICAL
Status: DISCONTINUED | OUTPATIENT
Start: 2024-08-29 | End: 2024-08-30 | Stop reason: HOSPADM

## 2024-08-29 RX ORDER — DEXAMETHASONE SODIUM PHOSPHATE 4 MG/ML
10 INJECTION, SOLUTION INTRA-ARTICULAR; INTRALESIONAL; INTRAMUSCULAR; INTRAVENOUS; SOFT TISSUE ONCE
Status: COMPLETED | OUTPATIENT
Start: 2024-08-29 | End: 2024-08-29

## 2024-08-29 RX ADMIN — FAMOTIDINE 20 MG: 10 INJECTION, SOLUTION INTRAVENOUS at 22:48

## 2024-08-29 RX ADMIN — EPINEPHRINE 0.5 MG: 1 INJECTION INTRAMUSCULAR; INTRAVENOUS; SUBCUTANEOUS at 22:29

## 2024-08-29 RX ADMIN — SODIUM CHLORIDE 1000 ML: 9 INJECTION, SOLUTION INTRAVENOUS at 22:40

## 2024-08-29 RX ADMIN — DEXAMETHASONE SODIUM PHOSPHATE 10 MG: 4 INJECTION, SOLUTION INTRA-ARTICULAR; INTRALESIONAL; INTRAMUSCULAR; INTRAVENOUS; SOFT TISSUE at 22:42

## 2024-08-29 ASSESSMENT — COLUMBIA-SUICIDE SEVERITY RATING SCALE - C-SSRS
1. IN THE PAST MONTH, HAVE YOU WISHED YOU WERE DEAD OR WISHED YOU COULD GO TO SLEEP AND NOT WAKE UP?: NO
2. HAVE YOU ACTUALLY HAD ANY THOUGHTS OF KILLING YOURSELF IN THE PAST MONTH?: NO
6. HAVE YOU EVER DONE ANYTHING, STARTED TO DO ANYTHING, OR PREPARED TO DO ANYTHING TO END YOUR LIFE?: NO

## 2024-08-29 ASSESSMENT — ACTIVITIES OF DAILY LIVING (ADL)
ADLS_ACUITY_SCORE: 35
ADLS_ACUITY_SCORE: 33

## 2024-08-29 NOTE — Clinical Note
Annamarie Mejía was seen and treated in our emergency department on 8/29/2024.  She may return to work on 08/31/2024.       If you have any questions or concerns, please don't hesitate to call.      Margareth East MD

## 2024-08-30 VITALS
TEMPERATURE: 98.9 F | BODY MASS INDEX: 26.03 KG/M2 | DIASTOLIC BLOOD PRESSURE: 73 MMHG | OXYGEN SATURATION: 98 % | WEIGHT: 162 LBS | HEIGHT: 66 IN | RESPIRATION RATE: 22 BRPM | HEART RATE: 89 BPM | SYSTOLIC BLOOD PRESSURE: 120 MMHG

## 2024-08-30 RX ORDER — EPINEPHRINE 0.3 MG/.3ML
0.3 INJECTION SUBCUTANEOUS PRN
Qty: 0.6 ML | Refills: 0 | Status: SHIPPED | OUTPATIENT
Start: 2024-08-30

## 2024-08-30 NOTE — ED PROVIDER NOTES
EMERGENCY DEPARTMENT ENCOUNTER      NAME: Annamarie Mejía  AGE: 23 year old female  YOB: 2000  MRN: [id number]  EVALUATION DATE & TIME: No admission date for patient encounter.    ED PROVIDER: Margareth East MD    Chief Complaint   Patient presents with    Insect Bites       FINAL IMPRESSION  1. Allergic reaction, initial encounter    2. Bee sting, accidental or unintentional, initial encounter        MEDICAL DECISION MAKING   Annamarie Mejía is a 23 year old female who presents for evaluation after being stung by a bee on her right arm.  Almost immediately after, she developed pain, redness, and swelling around the bite with some extension up into the more proximal extremity.  She reports that she then started to feel like she was having some difficulty breathing.  She has a history of allergic reaction to bees and in the past, has used an EpiPen but reports that she did not have any more at home to use so came here for further evaluation and management.  Patient reports that as compared to previous bee stings, her current symptoms feel worse.  She has noticed any lip or tongue swelling and has no abdominal pain, cramping, nausea, vomiting.  She was feeling well prior to being stung.  Patient is about 28 weeks pregnant and reports that she has no concerns about the pregnancy itself.  No vaginal bleeding, loss of fluid.    I considered a broad differential including anaphylaxis, allergic reaction, insect bite, contact dermatitis. Discussed options for workup and management with patient. Given report of difficulty breathing, and patient's history of allergic reaction, we agreed on plan for EpiPen, Decadron, and Pepcid.  Patient took Benadryl prior to arrival so we will hold on giving additional dose of this for now.    Patient had near complete resolution of symptoms after initial interventions.  She was observed for about 2.5 hours and after this, was very eager to go home.  I did recommend that we watch  her for a bit longer but she states that she has to work tomorrow and is feeling back to her baseline so was not interested in staying.  Will plan to send with a refill of her EpiPen and have her continue to use Benadryl as needed to prevent a rebound reaction.    At the end of the encounter, we reviewed the results, potential diagnoses, as well as return precautions and recommendations for follow up. I instructed Ms. Mejía to return to the emergency department immediately if she develops any new or worsening symptoms and provided additional verbal discharge instructions. Ms. Mejía expressed understanding and agreement with this plan of care, her questions were answered, and she was discharged in stable condition.      Considerations in Medical Decision Making  History:  Supplemental history from: Family Member/Significant Other  External Record(s) reviewed: Documented in chart    Work Up:  Chart documentation includes differential considered and any EKGs or imaging independently interpreted by provider, where specified.  In additional to work up documented, I considered the following work up: Documented in chart, if applicable.    External consultation:  Discussion of management with another provider: Documented in chart, if applicable    Complicating factors:  Care impacted by chronic illness: Mental Health  Care affected by social determinants of health: Access to Medical Care    Disposition considerations: Discharge. I prescribed additional prescription strength medication(s) as charted. I considered admission, but discharged patient after significant clinical improvement.      ED COURSE  10:12 PM I met the patient and performed my initial interview and exam.   10:50 PM I rechecked the patient.   11:59 PM I revisited and updated patient.    Critical care: 45 minutes excluding separately billable procedures.  Includes bedside management, time reviewing test results, review of records, discussing the case with  staff, documenting the medical record and time spent with family members (or surrogate decision makers) discussing specific treatment issues.       MEDICATIONS GIVEN IN THE ED  Medications   famotidine (PEPCID) injection 20 mg (20 mg Intravenous $Given 8/29/24 2248)   sodium chloride 0.9% BOLUS 1,000 mL (0 mLs Intravenous Stopped 8/30/24 0016)   dexAMETHasone (DECADRON) injection 10 mg (10 mg Intravenous $Given 8/29/24 2242)   EPINEPHrine (ADRENALIN) kit 0.5 mg (0.5 mg Intramuscular $Given 8/29/24 2229)       NEW PRESCRIPTIONS STARTED AT TODAY'S VISIT  Discharge Medication List as of 8/30/2024 12:20 AM        START taking these medications    Details   EPINEPHrine (ANY BX GENERIC EQUIV) 0.3 MG/0.3ML injection 2-pack Inject 0.3 mLs (0.3 mg) into the muscle as needed for anaphylaxis. May repeat one time in 5-15 minutes if response to initial dose is inadequate., Disp-0.6 mL, R-0, Local Print                =================================================================    HPI:    Patient information was obtained from: The patient and her significant other     Use of : N/A      Annamarie Mejía is a 23 year old female who presents for evaluatoin of a bee sting.     Around 9 PM, the patient was stung by a bee on her right arm. She has a known allergy to bees and has used epi pens in the past. The patient did not have an epi pen at home tonight, but she did take benadryl immediately after the sting. She had initial swelling and redness to her right arm. She endorses pain in her right arm. She became diaphoretic, nauseated, and short of breath after the sting. She notes that this feels worse than her bee stings in the past. She denies any rash, abdominal pain, lip or tongue swelling, or vomiting. The patient is 28 weeks pregnant. She denies any complications with her pregnancy.       RELEVANT HISTORY, MEDICATIONS, & ALLERGIES   Past medical history, surgical history, family history, medications, and allergies  "reviewed and pertinent noted in HPI.    REVIEW OF SYSTEMS:  A complete review of systems was performed with pertinent positives and negatives noted in the HPI. All other systems negative.     PHYSICAL EXAM:    Vitals: /73   Pulse 89   Temp 98.9  F (37.2  C) (Oral)   Resp 22   Ht 1.676 m (5' 6\")   Wt 73.5 kg (162 lb)   LMP 02/08/2024 (Approximate)   SpO2 98%   BMI 26.15 kg/m     General: Alert and interactive, comfortable appearing.  HENT: Atraumatic. Full AROM of neck. MMM.  No appreciable lip, tongue, oropharyngeal swelling.  Managing secretions without difficulty.  Cardiovascular: Regular rate and rhythm.   Chest/Pulmonary: Normal work of breathing. Speaking in complete sentences. Lungs CTAB. No chest wall tenderness or deformities.  Abdomen: Soft, gravid. Nontender without guarding or rebound.  Extremities: Normal AROM of all major joints.  Skin: Warm and dry. Normal skin color.  Small area of erythema over right forearm without surrounding crepitus, fluctuance.  No other visible rashes.    Neuro: Speech clear. CNs grossly intact. Moves all extremities spontaneously.   Psych: Normal affect/mood, cooperative, memory appropriate.        I, Erica Ceballos, am serving as a scribe to document services personally performed by Dr. Margareth East based on my observation and the provider's statements to me. I, Margareth East MD attest that Erica Ceballos is acting in a scribe capacity, has observed my performance of the services and has documented them in accordance with my direction.    Margareth East M.D.  Emergency Medicine  [facility] EMERGENCY DEPARTMENT  [address] [place] [id number]  [phone]  Dept: [phone]       Margareth East MD  08/30/24 0457    "

## 2024-08-30 NOTE — ED TRIAGE NOTES
Patient presents to ED for bee sting and possible allergic reaction.  Was stung approximately 45 minutes ago in right arm.  States feel warm all over and some chest pain.  Slight shortness of breath. Denies any swelling in throat or oral airway. Nausea. Has history of allergic reaction to bees in past.  Has used epi pen in the past.  Does not have one anymore.  Took Benedryl immediately after sting.  28 weeks pregnant.       Triage Assessment (Adult)       Row Name 08/29/24 2102          Triage Assessment    Airway WDL WDL        Respiratory WDL    Respiratory WDL WDL        Skin Circulation/Temperature WDL    Skin Circulation/Temperature WDL WDL        Cardiac WDL    Cardiac WDL WDL        Peripheral/Neurovascular WDL    Peripheral Neurovascular WDL WDL        Cognitive/Neuro/Behavioral WDL    Cognitive/Neuro/Behavioral WDL WDL

## 2024-08-30 NOTE — DISCHARGE INSTRUCTIONS
You were seen in the Emergency Department today for an allergic reaction.    You were treated with an EpiPen and decadron (a steroid to decrease inflammation), and famotidine (an H2 blocker, which has a similar action to benadryl).  You can take continue to use benadryl to help prevent your symptoms from coming back. Be aware that this will make you sleepy so I would recommend you take it close to bed time.     I have sent you with a refill of your EpiPen. Please carry this with you and use it if you have any symptoms of another allergic reaction. If you DO need to use it again, make sure you return to the ER.     If you begin to experience shortness of breath, difficulty breathing, difficulty swallowing, worsening swelling, or hives, you should return to the ER immediately. Otherwise, please follow up with your primary physician within the next 2-3 days for recheck and ongoing management.     Belowis some information that you might find informative and useful.    Thank you for choosing [facility] for your care. It was a pleasure taking care of you today!  - Dr. Margareth East

## 2024-11-01 ENCOUNTER — HOSPITAL ENCOUNTER (OUTPATIENT)
Facility: HOSPITAL | Age: 24
End: 2024-11-01
Admitting: FAMILY MEDICINE
Payer: COMMERCIAL

## 2024-11-01 ENCOUNTER — HOSPITAL ENCOUNTER (OUTPATIENT)
Facility: HOSPITAL | Age: 24
Discharge: HOME | End: 2024-11-01
Attending: FAMILY MEDICINE | Admitting: FAMILY MEDICINE
Payer: COMMERCIAL

## 2024-11-01 VITALS
DIASTOLIC BLOOD PRESSURE: 76 MMHG | TEMPERATURE: 98.8 F | OXYGEN SATURATION: 97 % | HEART RATE: 76 BPM | SYSTOLIC BLOOD PRESSURE: 120 MMHG | RESPIRATION RATE: 16 BRPM

## 2024-11-01 LAB — RUPTURE OF FETAL MEMBRANES BY ROM PLUS: NEGATIVE

## 2024-11-01 PROCEDURE — 99203 OFFICE O/P NEW LOW 30 MIN: CPT | Mod: GC

## 2024-11-01 PROCEDURE — G0463 HOSPITAL OUTPT CLINIC VISIT: HCPCS

## 2024-11-01 PROCEDURE — 84112 EVAL AMNIOTIC FLUID PROTEIN: CPT

## 2024-11-01 RX ORDER — LIDOCAINE 40 MG/G
CREAM TOPICAL
Status: DISCONTINUED | OUTPATIENT
Start: 2024-11-01 | End: 2024-11-01 | Stop reason: HOSPADM

## 2024-11-01 ASSESSMENT — ACTIVITIES OF DAILY LIVING (ADL)
ADLS_ACUITY_SCORE: 0
ADLS_ACUITY_SCORE: 0

## 2024-11-01 NOTE — PROGRESS NOTES
Data: Patient presented to [facility]: 2024  4:59 PM.  Reason for maternal/fetal assessment is uterine contractions and leaking vaginal fluid. Patient reports around 10am on 10/31 seeing bloody show and contractions. Clear liquid on a pad, in the shower, visible stream. Contractions have been occasional, she has not been able to time them. She is also feeling pelvic pressure. She reports having a headache this morning that rates a 3/10 and went away on its own. Patient denies vaginal bleeding, abdominal pain, vomiting, visual disturbances, epigastric or RUQ pain, significant edema. Patient reports fetal movement is decreased but has felt him a few times today. She thinks this started last night Patient is a 38w1d .  Prenatal record reviewed. Pregnancy has been complicated by mental health history . Awaiting prenatal records from her primary OB practice with [facility].     Vital signs wnl. Support person Fabricio is present.     Action: Verbal consent for EFM. Triage assessment completed.     Response: Patient verbalized agreement with plan. Will contact resident Dr. Keith with update and further orders.

## 2024-11-02 NOTE — PROGRESS NOTES
Paged resident team to inform them of SVE 0.5/50/-3. Also reviewed Category 1 tracing with baseline of 135bpm, moderate variability, and accels. Contractions 4x in 45 minute-long tracing. Ok to remove monitors at this time.

## 2024-11-02 NOTE — DISCHARGE INSTRUCTIONS
Learning About When to Call Your Doctor During Pregnancy (After 20 Weeks)  Overview  It's common to have concerns about what might be a problem when you're pregnant. Most pregnancies don't have any serious problems. But it's still important to know when to call your doctor if you have certain symptoms or signs of labor.  These are general suggestions. Your doctor may give you some more information about when to call.  When to call your doctor (after 20 weeks)  Call 911  anytime you think you may need emergency care. For example, call if:  You have severe vaginal bleeding. This means you are soaking through a pad each hour for 2 or more hours.  You have sudden, severe pain in your belly.  You have chest pain, are short of breath, or cough up blood.  You passed out (lost consciousness).  You have a seizure.  You see or feel the umbilical cord.  You think you are about to deliver your baby and can't make it safely to the hospital or birthing center.  Call your doctor now or seek immediate medical care if:  You have vaginal bleeding.  You have belly pain.  You have a fever.  You are dizzy or lightheaded, or you feel like you may faint.  You have signs of a blood clot in your leg (called a deep vein thrombosis), such as:  Pain in the calf, back of the knee, thigh, or groin.  Swelling in your leg or groin.  A color change on the leg or groin. The skin may be reddish or purplish, depending on your usual skin color.  You have symptoms of preeclampsia, such as:  Sudden swelling of your face, hands, or feet.  New vision problems (such as dimness, blurring, or seeing spots).  A severe headache.  You have a sudden release of fluid from your vagina. (You think your water broke.)  You've been having regular contractions for an hour. This means that you've had at least 6 contractions within 1 hour, even after you change your position and drink fluids.  You notice that your baby has stopped moving or is moving less than  "normal.  You have signs of heart failure, such as:  New or increased shortness of breath.  New or worse swelling in your legs, ankles, or feet.  Sudden weight gain, such as more than 2 to 3 pounds in a day or 5 pounds in a week.  Feeling so tired or weak that you cannot do your usual activities.  You have symptoms of a urinary tract infection. These may include:  Pain or burning when you urinate.  A frequent need to urinate without being able to pass much urine.  Pain in the flank, which is just below the rib cage and above the waist on either side of the back.  Blood in your urine.  Watch closely for changes in your health, and be sure to contact your doctor if:  You have vaginal discharge that smells bad.  You feel sad, anxious, or hopeless for more than a few days.  You have skin changes, such as a rash, itching, or a yellow color to your skin.  You have other concerns about your pregnancy.  If you have labor signs at 37 weeks or more  If you have signs of labor at 37 weeks or more, your doctor may tell you to call when your labor becomes more active. Symptoms of active labor include:  Contractions that are regular.  Contractions that are less than 5 minutes apart.  Contractions that are hard to talk through.  Follow-up care is a key part of your treatment and safety. Be sure to make and go to all appointments, and call your doctor if you are having problems. It's also a good idea to know your test results and keep a list of the medicines you take.  Where can you learn more?  Go to https://www.[employer].net/patiented  Enter N531 in the search box to learn more about \"Learning About When to Call Your Doctor During Pregnancy (After 20 Weeks).\"  Current as of: July 10, 2023  Content Version: 14.2 2024 [facility].   Care instructions adapted under license by your healthcare professional. If you have questions about a medical condition or this instruction, always ask your healthcare professional. " [employer] disclaims any warranty or liability for your use of this information.

## 2024-11-02 NOTE — CARE PLAN
Data: Patient assessed in the [facility] for leaking vaginal fluid. Cervix 0.5 cm dilated and 50% effaced. Fetal station -3. Membranes intact. Contractions are present. Contactions are  , 8-12 minutes apart, and last 40-70 seconds. Uterine assessment is mild by palpation during contractions and soft by palpation at rest. See flowsheets for fetal assessment documentation. ROMplus negative for ruptured of membranes.    Action: Presumed adequate fetal oxygenation documented. Discharge instructions reviewed. Patient instructed to report change in fetal movement, vaginal leaking of fluid or bleeding, abdominal pain, or any concerns related to the pregnancy to provider/clinic.      Response: Orders to discharge home per Dr. Riley. Patient verbalized understanding of education and agreement with plan. Discharged to home at 1940.

## 2024-11-02 NOTE — PROGRESS NOTES
OBSTETRICS TRIAGE ASSESSMENT NOTE  Annamarie Mejía is a 24 year old  at 38w1d gestation based on LMP who has presented to maternity care for labor rule out.     Does prenatal care with [facility]. Has had occasional contractions since yesterday AM, at which time she noted bloody show. She has also had some vaginal discharge making her concerned for ruptured membranes.     Was told by triage nurse early this AM to come into [facility] for evaluation given [facility] on divert but patient did not make it in until now.     Has had some reduced fetal movement in same time frame. Mild headache this AM but went away on its own. No vaginal bleeding. No dysuria, had negative UCx on 10/30.        PAST MEDICAL HISTORY  Past Medical History:   Diagnosis Date    Allergies 2008    rast all negative    Anxiety     Asthma     childhood    Behavior problems     Chronic sinusitis     Depression     Developmental delay     Fetal alcohol syndrome     Intermittent asthma 2012    Reactive attachment disorder 2010    psych hospitalization 6., rehospitalized .    RSV (respiratory syncytial virus infection)     hospital x1       PAST SURGICAL HISTORY   Past Surgical History:   Procedure Laterality Date    ENT SURGERY  2008    sinus    HC NASAL/SINUS ENDOSCOPY DIAG, W MAX SINUSOSCOPY  2009    LUMBAR FUSION  2020       MEDICATIONS    Current Facility-Administered Medications:     lidocaine (LMX4) cream, , Topical, Q1H PRN, Ml Keith MD    lidocaine 1 % 0.1-1 mL, 0.1-1 mL, Other, Q1H PRN, Ml Keith MD    sodium chloride (PF) 0.9% PF flush 3 mL, 3 mL, Intracatheter, Q8H, Ml Keith MD    sodium chloride (PF) 0.9% PF flush 3 mL, 3 mL, Intracatheter, q1 min prn, Ml Keith MD    Current Outpatient Medications:     ARIPiprazole (ABILIFY) 20 MG tablet, daily (Patient not taking: Reported on 2024), Disp: , Rfl:     buPROPion (WELLBUTRIN) 75 MG tablet, Take 75 mg by  mouth 2 times daily  (Patient not taking: Reported on 7/12/2024), Disp: , Rfl:     EPINEPHrine (ANY BX GENERIC EQUIV) 0.3 MG/0.3ML injection 2-pack, Inject 0.3 mLs (0.3 mg) into the muscle as needed for anaphylaxis. May repeat one time in 5-15 minutes if response to initial dose is inadequate., Disp: 0.6 mL, Rfl: 0    gabapentin (NEURONTIN) 100 MG capsule, 100 mg 2 times daily (Patient not taking: Reported on 7/12/2024), Disp: , Rfl:     guanFACINE HCl (INTUNIV) 3 MG TB24 24 hr tablet, Take 4 mg by mouth daily  (Patient not taking: Reported on 7/12/2024), Disp: , Rfl:     hydrOXYzine (VISTARIL) 25 MG capsule, Take 1 capsule (25 mg) by mouth 3 times daily (with meals) (Patient not taking: Reported on 7/12/2024), Disp: 90 capsule, Rfl: 0    hydrOXYzine (VISTARIL) 50 MG capsule, 50 mg At Bedtime (Patient not taking: Reported on 7/12/2024), Disp: , Rfl:     ibuprofen (ADVIL/MOTRIN) 400 MG tablet, Take 1 tablet (400 mg) by mouth every 6 hours as needed for moderate pain (Patient not taking: Reported on 7/12/2024), Disp: 60 tablet, Rfl: 0    montelukast (SINGULAIR) 10 MG tablet, Take 1 tablet (10 mg) by mouth At Bedtime (Patient not taking: Reported on 7/12/2024), Disp: 30 tablet, Rfl: 1    OXcarbazepine (TRILEPTAL) 300 MG tablet, Take 900 mg by mouth 2 times daily (Patient not taking: Reported on 7/12/2024), Disp: , Rfl:     senna-docusate (SENOKOT-S;PERICOLACE) 8.6-50 MG per tablet, Take 1 tablet by mouth 2 times daily (Patient not taking: Reported on 7/12/2024), Disp: 60 tablet, Rfl: 1    Facility-Administered Medications Ordered in Other Encounters:     sodium chloride (PF) 0.9% PF flush 3 mL, 3 mL, Intracatheter, Q8H, Azucena Frazier, APRN CNP, 10 mL at 09/22/15 4772    SOCIAL HISTORY:   Social History     Socioeconomic History    Marital status: Single     Spouse name: Not on file    Number of children: Not on file    Years of education: Not on file    Highest education level: Not on file   Occupational  History    Not on file   Tobacco Use    Smoking status: Never    Smokeless tobacco: Current    Tobacco comments:     no second hand smoke - vaping daily   Substance and Sexual Activity    Alcohol use: No    Drug use: No    Sexual activity: Never     Partners: Male     Birth control/protection: None   Other Topics Concern    Not on file   Social History Narrative    Lives with adoptive parents (cousins of bio mom) and infant brother.     Social Drivers of Health     Financial Resource Strain: Low Risk  (7/10/2024)    Received from [facility] [facility]    Financial Resource Strain     Difficulty of Paying Living Expenses: 3     Difficulty of Paying Living Expenses: Not on file   Food Insecurity: No Food Insecurity (7/10/2024)    Received from [employer] [facility]    Food Insecurity     Do you worry your food will run out before you are able to buy more?: 1   Transportation Needs: No Transportation Needs (7/10/2024)    Received from [employer] [facility]    Transportation Needs     Does lack of transportation keep you from medical appointments?: 1     Does lack of transportation keep you from work, meetings or getting things that you need?: 1   Physical Activity: Not on file   Stress: Not on file   Social Connections: Socially Integrated (7/10/2024)    Received from [employer] [facility]    Social Connections     Do you often feel lonely or isolated from those around you?: 0   Interpersonal Safety: Not At Risk (6/15/2023)    Received from [facility] [employer] [facility]     Do you feel UNSAFE in any of your personal relationships with your family members or any other acquaintances?: No   Housing Stability: Low Risk  (7/10/2024)    Received from [employer] [facility]    Housing Stability     What is your housing situation today?: 1       PHYSICAL EXAMINATION   /76  (BP Location: Right arm, Patient Position: Semi-Limon's, Cuff Size: Adult Regular)   Pulse 76   Temp 98.8  F (37.1  C) (Oral)   Resp 16   LMP 2024 (Approximate)   SpO2 97%   Gen: appears comfortable in no acute distress  HEENT: EOMi, MMM.  CV: appears well-perfused  Lungs: no increased work of breathing  Abdomen: Gravid  Cervix: 0.5 cm/ 50%/ -3 (per RN)  Extremities: no lower extremity edema    FETAL HEART MONITORING   Category I strip    CONTRACTIONS  4x on 45 min continuous strip    LAB RESULTS  Personally reviewed.  Recent Results (from the past 24 hours)   Rupture of Fetal Membranes by ROM Plus    Collection Time: 24  6:38 PM   Result Value Ref Range    Rupture of Fetal Membranes by ROM Plus Negative Negative, Invalid, Suggest Repeat       ASSESSMENT/PLAN:   24 year old  at 38w1d gestation presenting to labor & delivery for labor rule out.    Patient clinically stable, no concerning symptoms.   Infrequent contractions tolerable. No vaginal bleeding.   Baby looking good on monitor.   Cervix essentially closed.   ROM(-).   Reassured patient and discussed s/s suggesting active labor.   Discharged to home.      Precepted today with: MD Nathan Plascencia MD, PGY3  [facility]